# Patient Record
Sex: FEMALE | Race: WHITE | NOT HISPANIC OR LATINO | Employment: UNEMPLOYED | URBAN - NONMETROPOLITAN AREA
[De-identification: names, ages, dates, MRNs, and addresses within clinical notes are randomized per-mention and may not be internally consistent; named-entity substitution may affect disease eponyms.]

---

## 2022-06-22 ENCOUNTER — OFFICE VISIT (OUTPATIENT)
Dept: SURGERY | Facility: CLINIC | Age: 32
End: 2022-06-22

## 2022-06-22 VITALS
WEIGHT: 293 LBS | HEART RATE: 85 BPM | BODY MASS INDEX: 47.09 KG/M2 | HEIGHT: 66 IN | DIASTOLIC BLOOD PRESSURE: 76 MMHG | SYSTOLIC BLOOD PRESSURE: 118 MMHG | TEMPERATURE: 97.6 F

## 2022-06-22 DIAGNOSIS — R20.2 PARESTHESIA OF SKIN: Primary | ICD-10-CM

## 2022-06-22 PROBLEM — M54.9 BACK PAIN: Status: ACTIVE | Noted: 2022-06-22

## 2022-06-22 PROCEDURE — 11104 PUNCH BX SKIN SINGLE LESION: CPT | Performed by: NURSE PRACTITIONER

## 2022-06-22 PROCEDURE — 99203 OFFICE O/P NEW LOW 30 MIN: CPT | Performed by: NURSE PRACTITIONER

## 2022-06-22 PROCEDURE — 11105 PUNCH BX SKIN EA SEP/ADDL: CPT | Performed by: NURSE PRACTITIONER

## 2022-06-22 RX ORDER — SERTRALINE HYDROCHLORIDE 25 MG/1
TABLET, FILM COATED ORAL DAILY
COMMUNITY
Start: 2022-06-16

## 2022-06-22 RX ORDER — NORELGESTROMIN AND ETHINLY ESTRADIOL 150; 35 UG/D; UG/D
PATCH TRANSDERMAL
COMMUNITY
Start: 2022-06-21 | End: 2022-09-01

## 2022-06-22 RX ORDER — NAPROXEN 500 MG/1
500 TABLET ORAL 2 TIMES DAILY
COMMUNITY
Start: 2022-06-05 | End: 2022-09-01

## 2022-06-22 RX ORDER — CHOLECALCIFEROL (VITAMIN D3) 1250 MCG
50000 CAPSULE ORAL WEEKLY
COMMUNITY
Start: 2022-05-27

## 2022-06-22 RX ORDER — DULOXETIN HYDROCHLORIDE 30 MG/1
CAPSULE, DELAYED RELEASE ORAL 2 TIMES DAILY
COMMUNITY
Start: 2022-06-17

## 2022-06-22 RX ORDER — ACETAMINOPHEN AND CODEINE PHOSPHATE 300; 30 MG/1; MG/1
TABLET ORAL AS NEEDED
COMMUNITY
Start: 2022-05-12 | End: 2022-09-01

## 2022-06-22 RX ORDER — ETONOGESTREL 68 MG/1
IMPLANT SUBCUTANEOUS
COMMUNITY
Start: 2022-03-29 | End: 2022-09-01

## 2022-06-22 RX ORDER — TIZANIDINE 4 MG/1
TABLET ORAL 2 TIMES DAILY
COMMUNITY
Start: 2022-06-21 | End: 2022-09-01

## 2022-06-22 NOTE — PROGRESS NOTES
Renee Mosqueda is a 31 y.o. female     Chief Complaint: ***    History of Present Illness     Review of Systems   Constitutional: Negative.    HENT: Negative.    Eyes: Negative.    Respiratory: Negative.    Cardiovascular: Positive for leg swelling.   Gastrointestinal: Positive for constipation.        Hemorrhoids   Endocrine: Positive for heat intolerance.        Thirst   Genitourinary: Negative.         Urinary Incontinence   Musculoskeletal: Positive for back pain.        Leg Pain, Muscle Weakness, Arthritis   Skin: Negative.    Allergic/Immunologic: Negative.    Neurological: Positive for numbness.   Hematological: Negative.    Psychiatric/Behavioral: Negative.      Past Medical History:   Diagnosis Date   • Anxiety    • Back pain    • Seasonal allergies      Past Surgical History:   Procedure Laterality Date   • KNEE SURGERY Right     With meniscal repair     Family History   Problem Relation Age of Onset   • Hypertension Father    • Diabetes Maternal Aunt    • Diabetes Maternal Uncle    • Cancer Maternal Grandmother    • Cancer Paternal Grandfather      Social History     Socioeconomic History   • Marital status:    Tobacco Use   • Smoking status: Former Smoker     Quit date: 2019     Years since quitting: 3.4   • Smokeless tobacco: Never Used   • Tobacco comment: Electronuc Cigarette   Substance and Sexual Activity   • Alcohol use: Never     Allergies   Allergen Reactions   • Bactrim [Sulfamethoxazole-Trimethoprim] Anaphylaxis and Rash   • Citalopram Palpitations     Seratonin Syndrome   • Isopropyl Alcohol Rash   • Latex Rash     Vitals:    06/22/22 0836   BP: 118/76   Pulse: 85   Temp: 97.6 °F (36.4 °C)       Home Medications:  Prior to Admission medications    Medication Sig Start Date End Date Taking? Authorizing Provider   acetaminophen-codeine (TYLENOL #3) 300-30 MG per tablet As Needed. 5/12/22  Yes Provider, MD Aurelia   Cholecalciferol (Vitamin D3) 1.25 MG (81102 UT) capsule  Take 50,000 Units by mouth 1 (One) Time Per Week. 5/27/22  Yes ProviderAurelia MD   DULoxetine (CYMBALTA) 30 MG capsule 2 (Two) Times a Day. 6/17/22  Yes ProviderAurelia MD   naproxen (NAPROSYN) 500 MG tablet Take 500 mg by mouth 2 (Two) Times a Day. as directed 6/5/22  Yes Aurelia Couch MD   Nexplanon 68 MG implant subdermal implant  3/29/22  Yes Provider, MD Aurelia   sertraline (ZOLOFT) 25 MG tablet Daily. 6/16/22  Yes Provider, MD Aurelia   tiZANidine (ZANAFLEX) 4 MG tablet 2 (Two) Times a Day. 6/21/22  Yes ProviderAurelia MD Zafemy 150-35 MCG/24HR  6/21/22  Yes Provider, MD Aurelia       Objective   Physical Exam    Assessment & Plan       There were no encounter diagnoses.                         This document has been electronically signed by Gris Jacques MA on June 22, 2022 09:00 CDT

## 2022-06-22 NOTE — PROGRESS NOTES
"Chief Complaint  Advice Only (Consult for biopsy bilateral arm and legs)    Subjective        Sindy Mosqueda presents to [facility] GENERAL SURGERY  History of Present Illness  Ms. Sindy Mosqueda is a 31-year-old patient.  She was referred at request of Dr. Vallejo ([facility]) for punch biopsies of bilateral feet and legs to rule out small fiber neuropathy.  She states approximately 10 years she began having \"pins and needles\" sensation of feet and lower legs as well as some numbness as well which has caused her to have some difficulty with ambulating.  She denies any trauma to the areas.  She states she was recently started on Cymbalta and claims that it is helping some.  It is noted that she has had extensive work-up including blood work-up for peripheral neuropathy and EMG study with normal findings.       Objective   Vital Signs:  /76   Pulse 85   Temp 97.6 °F (36.4 °C) (Temporal)   Ht 167.6 cm (66\")   Wt 135 kg (297 lb 9.6 oz)   BMI 48.03 kg/m²   Estimated body mass index is 48.03 kg/m² as calculated from the following:    Height as of this encounter: 167.6 cm (66\").    Weight as of this encounter: 135 kg (297 lb 9.6 oz).    Class 3 Severe Obesity (BMI >=40). Obesity-related health conditions include the following: none. Obesity is unchanged. BMI is is above average; BMI management plan is completed. Information added to AVS.        Physical Exam  Vitals reviewed.   Constitutional:       General: She is not in acute distress.     Appearance: Normal appearance. She is obese. She is not ill-appearing, toxic-appearing or diaphoretic.   Cardiovascular:      Rate and Rhythm: Normal rate.   Pulmonary:      Effort: Pulmonary effort is normal. No respiratory distress.   Skin:         Neurological:      General: No focal deficit present.      Mental Status: She is alert and oriented to person, place, and time.   Psychiatric:         Mood and Affect: Mood normal.         " Behavior: Behavior normal.         Thought Content: Thought content normal.         Judgment: Judgment normal.        Consent obtained. After prepping the skin with Betadine and locally anesthetizing the areas with 2 cc of 2% xylocaine with epinephrine, a punch biopsy performed of each of the 4 areas indicated above. Hemostasis is accomplished with gelfoam and pressure. Tolerated well. Bandage applied to each area and specimens taken to pathology to be sent to [facility] for evaluation.            Result Review :                 Class 3 Severe Obesity (BMI >=40). Obesity-related health conditions include the following: none. Obesity is unchanged. BMI is is above average; BMI management plan is completed. We discussed portion control and increasing exercise.      Assessment and Plan   Diagnoses and all orders for this visit:    1. Paresthesia of skin (Primary)  -     Tissue Pathology Exam; Future  -     Tissue Pathology Exam           I spent 35 minutes caring for Sindy on this date of service. This time includes time spent by me in the following activities:preparing for the visit, obtaining and/or reviewing a separately obtained history, performing a medically appropriate examination and/or evaluation , documenting information in the medical record, care coordination and punch biopsies   Follow Up {Instructions Charge Capture  Follow-up Communications :23}  Return if symptoms worsen or fail to improve.  Patient may shower over biopsy sites in 24 hours cover with dry bandage as needed.  Results will be forwarded to Dr. Vallejo for review and further treatment.  Patient to follow-up on as needed basis. She is aware of signs/symptoms that would require further medical attention.       Patient was given instructions and counseling regarding her condition or for health maintenance advice. Please see specific information pulled into the AVS if appropriate.                 This document has been electronically signed  by Vanesa Morrison APRN on June 22, 2022 14:11 CDT

## 2022-06-29 ENCOUNTER — PATIENT ROUNDING (BHMG ONLY) (OUTPATIENT)
Dept: SURGERY | Facility: CLINIC | Age: 32
End: 2022-06-29

## 2022-06-29 NOTE — PROGRESS NOTES
"June 29, 2022    Hello, may I speak with Sindy Mosqueda? This is Sindy    My name is Gris Jacques    I am a Medical Assistant with [facility] GENERAL SURGERY    Before we get started may I verify your date of birth? 1990 Date Of Birth Verified.    I am calling to officially welcome you to our practice and ask about your recent visit. Is this a good time to talk? Yes.    Tell me about your visit with us. What things went well? \"Everything went well. Everyone was kind and welcoming.\"    We're always looking for ways to make our patients' experiences even better. Do you have recommendations on ways we may improve?  \"No, I think you all did a fantastic job.\"    Overall were you satisfied with your first visit to our practice? Yes.       I appreciate you taking the time to speak with me today. Is there anything else I can do for you? No.      Thank you, and have a great day.    Patient instructed to call the office with any questions or concerns.  "

## 2022-07-08 LAB
LAB AP CASE REPORT: NORMAL
PATH REPORT.FINAL DX SPEC: NORMAL

## 2022-09-01 ENCOUNTER — OFFICE VISIT (OUTPATIENT)
Dept: CARDIOLOGY | Facility: CLINIC | Age: 32
End: 2022-09-01

## 2022-09-01 VITALS
WEIGHT: 293 LBS | HEIGHT: 66 IN | DIASTOLIC BLOOD PRESSURE: 82 MMHG | BODY MASS INDEX: 47.09 KG/M2 | SYSTOLIC BLOOD PRESSURE: 140 MMHG | OXYGEN SATURATION: 98 % | TEMPERATURE: 97.1 F | HEART RATE: 100 BPM

## 2022-09-01 DIAGNOSIS — I10 ESSENTIAL HYPERTENSION: ICD-10-CM

## 2022-09-01 DIAGNOSIS — R00.2 PALPITATIONS: Primary | ICD-10-CM

## 2022-09-01 DIAGNOSIS — R06.09 DYSPNEA ON EXERTION: ICD-10-CM

## 2022-09-01 DIAGNOSIS — E66.01 CLASS 3 SEVERE OBESITY DUE TO EXCESS CALORIES WITH SERIOUS COMORBIDITY AND BODY MASS INDEX (BMI) OF 45.0 TO 49.9 IN ADULT: ICD-10-CM

## 2022-09-01 PROBLEM — E66.2 HYPOVENTILATION ASSOCIATED WITH OBESITY: Status: RESOLVED | Noted: 2022-09-01 | Resolved: 2022-09-01

## 2022-09-01 PROBLEM — E66.2 HYPOVENTILATION ASSOCIATED WITH OBESITY: Status: ACTIVE | Noted: 2022-09-01

## 2022-09-01 LAB
QT INTERVAL: 320 MS
QTC INTERVAL: 412 MS

## 2022-09-01 PROCEDURE — 93000 ELECTROCARDIOGRAM COMPLETE: CPT | Performed by: INTERNAL MEDICINE

## 2022-09-01 PROCEDURE — 99204 OFFICE O/P NEW MOD 45 MIN: CPT | Performed by: INTERNAL MEDICINE

## 2022-09-01 RX ORDER — LEFLUNOMIDE 20 MG/1
TABLET ORAL
COMMUNITY
Start: 2022-08-30

## 2022-09-01 RX ORDER — LISINOPRIL 10 MG/1
TABLET ORAL
COMMUNITY
Start: 2022-08-19

## 2022-09-01 RX ORDER — FOLIC ACID 1 MG/1
1000 TABLET ORAL DAILY
COMMUNITY
Start: 2022-08-01 | End: 2023-02-09

## 2022-09-01 NOTE — PROGRESS NOTES
Sindy Mosqueda  31 y.o. female    09/01/2022  1. Palpitations    2. Dyspnea on exertion    3. Essential hypertension    4. Class 3 severe obesity due to excess calories with serious comorbidity and body mass index (BMI) of 45.0 to 49.9 in adult (HCC)        History of Present Illness  Sindy Mosqueda is a 31-year-old female who was referred by primary care physician for evaluation of palpitation.  Apparently the symptoms have been going on for about 5 years and they have been intermittent.  They occur at different times and has occurred both at rest and with activity.  Heart rate is frequently above 100 bpm.  She has not been seen by cardiology in the past.  She has no previous documented coronary artery disease, valvular heart disease or congenital heart disease. No history of heart murmurs in the past.  She has history of hypertension but no diabetes mellitus, thyroid problems.  She has a history of anxiety/depression, opioid dependence in the past.  She has had history of pins-and-needles in the legs and has had evaluation in June 2022 for this.    She denies any chest pain but does have NYHA class II dyspnea on exertion.  No PND or orthopnea is reported.  She does have osteoarthritis involving both her knees with difficulty in ambulation.  Her BMI is 47.7.  She does not drink excessive amounts of caffeine or tea and not on any over-the-counter medications.    EKG showed sinus rhythm with heart rate of 100 bpm.  No ST-T wave changes suggestive of ischemia.  No evidence of preexcitation.    SUBJECTIVE    Allergies   Allergen Reactions   • Bactrim [Sulfamethoxazole-Trimethoprim] Anaphylaxis and Rash   • Citalopram Palpitations     Seratonin Syndrome   • Isopropyl Alcohol Rash   • Latex Rash         Past Medical History:   Diagnosis Date   • Anxiety    • Back pain    • Seasonal allergies          Past Surgical History:   Procedure Laterality Date   • KNEE SURGERY Right     With meniscal repair         Family  "History   Problem Relation Age of Onset   • Hypertension Father    • Diabetes Maternal Aunt    • Diabetes Maternal Uncle    • Cancer Maternal Grandmother    • Cancer Paternal Grandfather          Social History     Socioeconomic History   • Marital status:    Tobacco Use   • Smoking status: Former Smoker     Quit date: 2019     Years since quitting: 3.6   • Smokeless tobacco: Never Used   • Tobacco comment: Electronuc Cigarette   Substance and Sexual Activity   • Alcohol use: Never         Current Outpatient Medications   Medication Sig Dispense Refill   • Cholecalciferol (Vitamin D3) 1.25 MG (05421 UT) capsule Take 50,000 Units by mouth 1 (One) Time Per Week.     • DULoxetine (CYMBALTA) 30 MG capsule 2 (Two) Times a Day.     • folic acid (FOLVITE) 1 MG tablet Take 1,000 mcg by mouth Daily.     • leflunomide (ARAVA) 20 MG tablet      • lisinopril (PRINIVIL,ZESTRIL) 10 MG tablet      • sertraline (ZOLOFT) 25 MG tablet Daily.       No current facility-administered medications for this visit.         OBJECTIVE    /82 (BP Location: Left arm, Patient Position: Sitting, Cuff Size: Adult)   Pulse 100   Temp 97.1 °F (36.2 °C)   Ht 167.6 cm (66\")   Wt 134 kg (295 lb 9.6 oz)   SpO2 98%   BMI 47.71 kg/m²         Review of Systems     Constitutional:  Denies recent weight loss, weight gain, fever or chills     HENT:  Denies any hearing loss, epistaxis, hoarseness, or difficulty speaking.     Eyes: Wears eyeglasses or contact lenses     Respiratory:  Dyspnea with exertion, no cough, wheezing, or hemoptysis.     Cardiovascular: See HPI    Gastrointestinal:  Denies change in bowel habits, dyspepsia, ulcer disease, hematochezia, or melena.     Endocrine: Negative for cold intolerance, heat intolerance, polydipsia, polyphagia and polyuria.     Genitourinary: Negative.      Musculoskeletal: DJD.  Osteoarthritis both knees    Skin:  Denies any change in hair or nails, rashes, or skin lesions. "     Allergic/Immunologic: Negative.  Negative for environmental allergies, food allergies and/or immunocompromised state.     Neurological:  Denies any history of recurrent headaches, strokes, TIA, or seizure disorder.  Tingling and numbness both feet.    Hematological: Denies any food allergies, seasonal allergies, bleeding disorders, or lymphadenopathy.     Psychiatric/Behavioral: Denies any history of depression, substance abuse, or change in cognitive function.         Physical Exam     Constitutional: Cooperative, alert and oriented, in no acute distress.  Obese with a BMI of 47.7    HENT:   Head: Normocephalic, normal hair patterns, no masses or tenderness.  Ears, Nose, and Throat: No gross abnormalities. No pallor or cyanosis. Dentition good.   Eyes: EOMS intact, PERRL, conjunctivae and lids unremarkable. Fundoscopic exam and visual fields not performed.   Neck: No palpable masses or adenopathy, no thyromegaly, no JVD, carotid pulses are full and equal bilaterally and without bruit.     Cardiovascular: Regular rhythm, S1 and S2 normal, no S3 or S4.  No murmurs, gallops, or rubs detected.  Tachycardia    Pulmonary/Chest: Chest: normal symmetry, normal respiratory excursion, no intercostal retraction, no use of accessory muscles.     Pulmonary: Normal breath sounds. No rales or rhonchi.    Abdominal: Abdomen soft, bowel sounds normoactive, no masses, no hepatosplenomegaly, nontender, no bruit.     Musculoskeletal: No deformities, clubbing, cyanosis, erythema, or edema observed.     Neurological: No gross motor or sensory deficits noted, affect appropriate, oriented to time, person, place.     Skin: Warm and dry to the touch, no apparent skin lesion or mass noted.     Psychiatric: She has a normal mood and affect. Her behavior is normal. Judgment and thought content normal.         Procedures      No results found for: WBC, HGB, HCT, MCV, PLT  No results found for: GLUCOSE, BUN, CREATININE, EGFRIFNONA,  EGFRIFAFRI, BCR, POTASSIUM, CO2, CALCIUM, PROTENTOTREF, ALBUMIN, LABIL2, BILIRUBIN, AST, ALT  No results found for: CHOL  No results found for: TRIG  No results found for: HDL  No components found for: LDLCALC  No results found for: LDL  No results found for: HDLLDLRATIO  No components found for: CHOLHDL  No results found for: HGBA1C  No results found for: TSH, K2TMUZV, Q1WMCQN, THYROIDAB        ASSESSMENT AND PLAN  Sindy Mosqueda is a 31-year-old female who is presented with longstanding history of palpitation in the background of obesity, hypertension, anxiety/depression, DJD.  She does have sinus tachycardia on baseline EKG.  One of the previous EKG did raise a question of incomplete RBBB.  To make sure that there are no sustained cardiac arrhythmias the plan will be to proceed with a Holter monitor for 72 hours.  An echocardiogram to assess left ventricular and valvular function has been arranged.  I suspect that anxiety is a significant contributing factor and the plan will be to consider beta-blockers after reviewing the results of the above test.  Suspicion for ongoing ischemia is low.  Her thyroid function studies have been normal in the past and CBC, CMP were within normal limits when checked in August 2022.  Further recommendations will follow.  Thank you for asked me to see this patient.    Diagnoses and all orders for this visit:    1. Palpitations (Primary)  -     ECG 12 Lead    2. Dyspnea on exertion    3. Essential hypertension    4. Class 3 severe obesity due to excess calories with serious comorbidity and body mass index (BMI) of 45.0 to 49.9 in adult (HCC)        Class 3 Severe Obesity (BMI >=40). Obesity-related health conditions include the following: hypertension and osteoarthritis. Obesity is unchanged. BMI is is above average; BMI management plan is completed. We discussed portion control and increasing exercise.      Sindy Mosqueda  reports that she quit smoking about 3 years ago. She has  never used smokeless tobacco..             Wili Hernandez MD  9/1/2022  09:25 CDT

## 2022-09-02 ENCOUNTER — PATIENT ROUNDING (BHMG ONLY) (OUTPATIENT)
Dept: CARDIOLOGY | Facility: CLINIC | Age: 32
End: 2022-09-02

## 2022-09-02 NOTE — PROGRESS NOTES
September 2, 2022    Hello, may I speak with Sindy Mosqueda?    My name is MARCOS Boggs      I am  with [facility] CARDIOLOGY [facility] CARDIOLOGY  [facility] [address] [place] [address] [phone].    Before we get started may I verify your date of birth? 1990    I am calling to officially welcome you to our practice and ask about your recent visit. Is this a good time to talk? yes    Tell me about your visit with us. What things went well?  The doctor and his nurse was very nice.        We're always looking for ways to make our patients' experiences even better. Do you have recommendations on ways we may improve?  no    Overall were you satisfied with your first visit to our practice? yes       I appreciate you taking the time to speak with me today. Is there anything else I can do for you? no      Thank you, and have a great day.

## 2022-09-08 ENCOUNTER — TELEPHONE (OUTPATIENT)
Dept: CARDIOLOGY | Facility: CLINIC | Age: 32
End: 2022-09-08

## 2022-09-08 RX ORDER — METOPROLOL SUCCINATE 25 MG/1
25 TABLET, EXTENDED RELEASE ORAL NIGHTLY
Qty: 90 TABLET | Refills: 3 | Status: SHIPPED | OUTPATIENT
Start: 2022-09-08 | End: 2022-10-04

## 2022-09-08 NOTE — TELEPHONE ENCOUNTER
Called pt no answer left vm to call the office medication was sent to her pharmacy----- Message from Wili Hernandez MD sent at 9/8/2022  8:37 AM CDT -----  Holter monitor did show sinus tachycardia.  No significant cardiac arrhythmias noted.  We can start her on metoprolol succinate 25 mg daily at bedtime to see if her palpitations and increased heart rate improves.  ----- Message -----  From: Wili Hernandez MD  Sent: 9/7/2022   9:13 AM CDT  To: Wili Hernandez MD

## 2022-10-04 RX ORDER — ATENOLOL 25 MG/1
25 TABLET ORAL DAILY
Qty: 90 TABLET | Refills: 2 | Status: SHIPPED | OUTPATIENT
Start: 2022-10-04

## 2023-02-09 ENCOUNTER — OFFICE VISIT (OUTPATIENT)
Dept: CARDIOLOGY | Facility: CLINIC | Age: 33
End: 2023-02-09
Payer: COMMERCIAL

## 2023-02-09 VITALS
WEIGHT: 293 LBS | DIASTOLIC BLOOD PRESSURE: 78 MMHG | BODY MASS INDEX: 47.09 KG/M2 | HEART RATE: 88 BPM | HEIGHT: 66 IN | TEMPERATURE: 97.1 F | OXYGEN SATURATION: 97 % | SYSTOLIC BLOOD PRESSURE: 136 MMHG

## 2023-02-09 DIAGNOSIS — E66.01 CLASS 3 SEVERE OBESITY DUE TO EXCESS CALORIES WITH SERIOUS COMORBIDITY AND BODY MASS INDEX (BMI) OF 45.0 TO 49.9 IN ADULT: ICD-10-CM

## 2023-02-09 DIAGNOSIS — R00.2 PALPITATIONS: ICD-10-CM

## 2023-02-09 DIAGNOSIS — I10 ESSENTIAL HYPERTENSION: Primary | ICD-10-CM

## 2023-02-09 PROCEDURE — 99214 OFFICE O/P EST MOD 30 MIN: CPT | Performed by: INTERNAL MEDICINE

## 2023-02-09 RX ORDER — ADALIMUMAB 40MG/0.4ML
KIT SUBCUTANEOUS
COMMUNITY
Start: 2023-01-12

## 2023-02-09 RX ORDER — PERPHENAZINE 16 MG
TABLET ORAL EVERY 8 HOURS SCHEDULED
COMMUNITY

## 2023-02-09 NOTE — PROGRESS NOTES
Sindy Mosqueda  32 y.o. female    1. Essential hypertension    2. Palpitations    3. Class 3 severe obesity due to excess calories with serious comorbidity and body mass index (BMI) of 45.0 to 49.9 in adult (HCC)        History of Present Illness  Sindy Mosqueda is a 32-year-old female who was referred by primary care physician for evaluation of palpitation.  Apparently the symptoms have been going on for about 5 years and they have been intermittent.  They occur at different times and has occurred both at rest and with activity.  Heart rate is frequently above 100 bpm.  She has not been seen by cardiology in the past.  She has no previous documented coronary artery disease, valvular heart disease or congenital heart disease. No history of heart murmurs in the past.  She has history of hypertension but no diabetes mellitus, thyroid problems.  She has a history of anxiety/depression, opioid dependence in the past.  She has had history of pins-and-needles in the legs and has had evaluation in June 2022 for this.    She denies any chest pain but does have NYHA class II dyspnea on exertion.  No PND or orthopnea is reported.  She does have osteoarthritis involving both her knees with difficulty in ambulation.  Her BMI is 47.7.  She does not drink excessive amounts of caffeine or tea and not on any over-the-counter medications.    Further testing included a Holter monitor which showed the following findings in September 2022.  Result: Underlying rhythm was sinus with heart rate ranging from 87bpm to 165 bpm with an average heart rate of 131 bpm.  There were no PACs or PVCs noted.  No supraventricular or ventricular arrhythmias noted.  There was 1 patient triggered event which correlated with sinus tachycardia.  Impression: Holter monitoring showing essentially sinus rhythm with sinus tachycardia.  No supraventricular or ventricular arrhythmias noted.    Echocardiogram November 2022 showed:   •  Left ventricular  ejection fraction appears to be 61 - 65%.  •  Estimated right ventricular systolic pressure from tricuspid regurgitation is normal (<35 mmHg).  •  No significant valve abnormalities     She was started initially on metoprolol succinate but she was unable to tolerate this medicine and hence we changed it to atenolol 25 mg daily. She seems to have tolerated this quite well and the heart rate has improved.  She informs me that more than 80% of the time her heart rate is within normal limits.  Her palpitations are rare.    SUBJECTIVE    Allergies   Allergen Reactions   • Bactrim [Sulfamethoxazole-Trimethoprim] Anaphylaxis and Rash   • Nsaids Other (See Comments)   • Citalopram Palpitations     Seratonin Syndrome   • Isopropyl Alcohol Rash   • Latex Rash         Past Medical History:   Diagnosis Date   • Anxiety    • Back pain    • Seasonal allergies          Past Surgical History:   Procedure Laterality Date   • KNEE SURGERY Right     With meniscal repair         Family History   Problem Relation Age of Onset   • Hypertension Father    • Diabetes Maternal Aunt    • Diabetes Maternal Uncle    • Cancer Maternal Grandmother    • Cancer Paternal Grandfather          Social History     Socioeconomic History   • Marital status:    Tobacco Use   • Smoking status: Former     Types: Cigarettes     Quit date:      Years since quittin.1   • Smokeless tobacco: Never   • Tobacco comments:     Electronuc Cigarette   Substance and Sexual Activity   • Alcohol use: Never         Current Outpatient Medications   Medication Sig Dispense Refill   • Alpha-Lipoic Acid 600 MG capsule Every 8 (Eight) Hours.     • atenolol (TENORMIN) 25 MG tablet Take 1 tablet by mouth Daily. 90 tablet 2   • Cholecalciferol (Vitamin D3) 1.25 MG (54424 UT) capsule Take 50,000 Units by mouth 1 (One) Time Per Week.     • DULoxetine (CYMBALTA) 30 MG capsule 2 (Two) Times a Day.     • Humira Pen 40 MG/0.4ML Pen-injector Kit      • leflunomide (ARAVA)  "20 MG tablet      • lisinopril (PRINIVIL,ZESTRIL) 10 MG tablet      • sertraline (ZOLOFT) 25 MG tablet Daily.       No current facility-administered medications for this visit.         OBJECTIVE    /78 (BP Location: Left arm, Patient Position: Sitting, Cuff Size: Adult)   Pulse 88   Temp 97.1 °F (36.2 °C)   Ht 167.6 cm (66\")   Wt (!) 137 kg (303 lb)   SpO2 97%   BMI 48.91 kg/m²         Review of Systems: The following systems were reviewed and changes noted as indicated below    Constitutional:  Denies recent weight loss, weight gain, fever or chills     HENT:  Denies any hearing loss, epistaxis, hoarseness, or difficulty speaking.     Eyes: Wears eyeglasses or contact lenses     Respiratory:  Dyspnea with exertion, no cough, wheezing, or hemoptysis.     Cardiovascular: Palpitations better.    Gastrointestinal:  Denies change in bowel habits, dyspepsia, ulcer disease, hematochezia, or melena.     Endocrine: Negative for cold intolerance, heat intolerance, polydipsia, polyphagia and polyuria.     Genitourinary: Negative.      Musculoskeletal: DJD.  Osteoarthritis both knees    Skin:  Denies any change in hair or nails, rashes, or skin lesions.     Allergic/Immunologic: Negative.  Negative for environmental allergies, food allergies and/or immunocompromised state.     Neurological:  Denies any history of recurrent headaches, strokes, TIA, or seizure disorder.  Tingling and numbness both feet.    Hematological: Denies any food allergies, seasonal allergies, bleeding disorders, or lymphadenopathy.     Psychiatric/Behavioral: Denies any history of depression, substance abuse, or change in cognitive function.       Physical Exam: The following systems were reviewed and changes noted as indicated below    Constitutional: Cooperative, alert and oriented, in no acute distress.  Obese with a BMI of 48.9  HENT:   Head: Normocephalic, normal hair patterns, no masses or tenderness.  Ears, Nose, and Throat: No gross " abnormalities. No pallor or cyanosis. Dentition good.   Eyes: EOMS intact, PERRL, conjunctivae and lids unremarkable. Fundoscopic exam and visual fields not performed.   Neck: No palpable masses or adenopathy, no thyromegaly, no JVD, carotid pulses are full and equal bilaterally and without bruit.     Cardiovascular: Regular rhythm, S1 and S2 normal, no S3 or S4.  No murmurs, gallops, or rubs detected.  Tachycardia    Pulmonary/Chest: Chest: normal symmetry, normal respiratory excursion, no intercostal retraction, no use of accessory muscles.     Pulmonary: Normal breath sounds. No rales or rhonchi.    Abdominal: Abdomen soft, bowel sounds normoactive, no masses, no hepatosplenomegaly, nontender, no bruit.     Musculoskeletal: No deformities, clubbing, cyanosis, erythema, or edema observed.     Neurological: No gross motor or sensory deficits noted, affect appropriate, oriented to time, person, place.     Skin: Warm and dry to the touch, no apparent skin lesion or mass noted.     Psychiatric: She has a normal mood and affect. Her behavior is normal. Judgment and thought content normal.         Procedures      No results found for: WBC, HGB, HCT, MCV, PLT  No results found for: GLUCOSE, BUN, CREATININE, EGFRIFNONA, EGFRIFAFRI, BCR, POTASSIUM, CO2, CALCIUM, PROTENTOTREF, ALBUMIN, LABIL2, BILIRUBIN, AST, ALT  No results found for: CHOL  No results found for: TRIG  No results found for: HDL  No components found for: LDLCALC  No results found for: LDL  No results found for: HDLLDLRATIO  No components found for: CHOLHDL  No results found for: HGBA1C  No results found for: TSH, B4WTGLD, S6VTWHQ, THYROIDAB        ASSESSMENT AND PLAN  Sindy Mosqueda is a 32-year-old female who presented with longstanding history of palpitation in the background of obesity, hypertension, anxiety/depression, DJD.  She does have sinus tachycardia on baseline EKG.  One of the previous EKG did raise a question of incomplete RBBB.  She was noted  to have sinus tachycardia on Holter monitor.  No supraventricular or ventricular arrhythmias noted.  Echocardiogram showed normal LV systolic function with no significant valve abnormalities.    Her heart rate and blood pressure were in the acceptable normal range today.  After reviewing her medicines of continued antihypertensive therapy with lisinopril and atenolol 25 mg daily has been continued.  No further cardiac work-up indicated at this time.    Diagnoses and all orders for this visit:    1. Essential hypertension (Primary)    2. Palpitations    3. Class 3 severe obesity due to excess calories with serious comorbidity and body mass index (BMI) of 45.0 to 49.9 in adult (HCC)        Class 3 Severe Obesity (BMI >=40). Obesity-related health conditions include the following: hypertension and osteoarthritis. Obesity is unchanged. BMI is is above average; BMI management plan is completed. We discussed portion control and increasing exercise.      Sindy Mosqueda  reports that she quit smoking about 4 years ago. Her smoking use included cigarettes. She has never used smokeless tobacco..             Wili Hernandez MD  2/9/2023  09:50 CST

## 2023-08-10 ENCOUNTER — OFFICE VISIT (OUTPATIENT)
Dept: CARDIOLOGY | Facility: CLINIC | Age: 33
End: 2023-08-10
Payer: COMMERCIAL

## 2023-08-10 VITALS
DIASTOLIC BLOOD PRESSURE: 78 MMHG | HEART RATE: 77 BPM | TEMPERATURE: 97.1 F | WEIGHT: 293 LBS | OXYGEN SATURATION: 97 % | BODY MASS INDEX: 47.09 KG/M2 | SYSTOLIC BLOOD PRESSURE: 136 MMHG | HEIGHT: 66 IN

## 2023-08-10 DIAGNOSIS — R00.2 PALPITATIONS: Primary | ICD-10-CM

## 2023-08-10 DIAGNOSIS — I10 ESSENTIAL HYPERTENSION: ICD-10-CM

## 2023-08-10 DIAGNOSIS — R06.09 DYSPNEA ON EXERTION: ICD-10-CM

## 2023-08-10 DIAGNOSIS — E66.01 CLASS 3 SEVERE OBESITY DUE TO EXCESS CALORIES WITH SERIOUS COMORBIDITY AND BODY MASS INDEX (BMI) OF 50.0 TO 59.9 IN ADULT: ICD-10-CM

## 2023-08-10 PROCEDURE — 1160F RVW MEDS BY RX/DR IN RCRD: CPT | Performed by: INTERNAL MEDICINE

## 2023-08-10 PROCEDURE — 1159F MED LIST DOCD IN RCRD: CPT | Performed by: INTERNAL MEDICINE

## 2023-08-10 PROCEDURE — 3075F SYST BP GE 130 - 139MM HG: CPT | Performed by: INTERNAL MEDICINE

## 2023-08-10 PROCEDURE — 99214 OFFICE O/P EST MOD 30 MIN: CPT | Performed by: INTERNAL MEDICINE

## 2023-08-10 PROCEDURE — 3078F DIAST BP <80 MM HG: CPT | Performed by: INTERNAL MEDICINE

## 2023-08-10 RX ORDER — ESOMEPRAZOLE MAGNESIUM 40 MG/1
1 CAPSULE, DELAYED RELEASE ORAL DAILY
COMMUNITY
Start: 2023-07-23

## 2023-08-10 RX ORDER — PROPRANOLOL HYDROCHLORIDE 40 MG/1
TABLET ORAL
COMMUNITY
Start: 2023-08-08

## 2023-08-10 RX ORDER — LEVOCETIRIZINE DIHYDROCHLORIDE 5 MG/1
1 TABLET, FILM COATED ORAL DAILY
COMMUNITY
Start: 2023-07-07

## 2023-08-10 RX ORDER — ATORVASTATIN CALCIUM 20 MG/1
1 TABLET, FILM COATED ORAL DAILY
COMMUNITY
Start: 2023-07-16

## 2023-08-10 RX ORDER — CYCLOBENZAPRINE HCL 10 MG
TABLET ORAL
COMMUNITY
Start: 2023-08-08

## 2023-08-10 RX ORDER — ALBUTEROL SULFATE 90 UG/1
AEROSOL, METERED RESPIRATORY (INHALATION)
COMMUNITY
Start: 2023-04-14

## 2023-08-10 RX ORDER — REPOSITORY CORTICOTROPIN 80 [USP'U]/ML
INJECTION INTRAMUSCULAR; SUBCUTANEOUS
COMMUNITY
Start: 2023-07-29

## 2023-08-10 RX ORDER — PREDNISONE 10 MG/1
TABLET ORAL
COMMUNITY
Start: 2023-08-04

## 2023-08-10 RX ORDER — TOCILIZUMAB 180 MG/ML
INJECTION, SOLUTION SUBCUTANEOUS
COMMUNITY
Start: 2023-08-08

## 2023-08-10 RX ORDER — PREGABALIN 25 MG/1
CAPSULE ORAL
COMMUNITY
Start: 2023-07-18

## 2023-08-10 NOTE — PROGRESS NOTES
Sindy Mosqueda  32 y.o. female    1. Palpitations    2. Class 3 severe obesity due to excess calories with serious comorbidity and body mass index (BMI) of 50.0 to 59.9 in adult    3. Essential hypertension    4. Dyspnea on exertion        History of Present Illness  Sindy Mosqueda is a 32-year-old female who was referred by primary care physician for evaluation of palpitation.  Apparently the symptoms have been going on for about 5 years and they have been intermittent.  They occur at different times and has occurred both at rest and with activity.  Heart rate is frequently above 100 bpm.  She has not been seen by cardiology in the past.  She has no previous documented coronary artery disease, valvular heart disease or congenital heart disease. No history of heart murmurs in the past.  She has history of hypertension but no diabetes mellitus, thyroid problems.  She has a history of anxiety/depression, opioid dependence in the past.  She has had history of pins-and-needles in the legs and has had evaluation in June 2022 for this.    She denies any chest pain but does have NYHA class II dyspnea on exertion.  No PND or orthopnea is reported.  She does have osteoarthritis involving both her knees with difficulty in ambulation.  She does not drink excessive amounts of caffeine or tea and not on any over-the-counter medications.    Further testing included a Holter monitor which showed the following findings in September 2022.  Result: Underlying rhythm was sinus with heart rate ranging from 87bpm to 165 bpm with an average heart rate of 131 bpm.  There were no PACs or PVCs noted.  No supraventricular or ventricular arrhythmias noted.  There was 1 patient triggered event which correlated with sinus tachycardia.  Impression: Holter monitoring showing essentially sinus rhythm with sinus tachycardia.  No supraventricular or ventricular arrhythmias noted.    Echocardiogram November 2022 showed:     Left ventricular  ejection fraction appears to be 61 - 65%.    Estimated right ventricular systolic pressure from tricuspid regurgitation is normal (<35 mmHg).    No significant valve abnormalities     EKG today showed sinus rhythm with heart rate of 77 bpm.  No ST-T wave changes suggestive of ischemia.  No arrhythmia noted.    I had started her on atenolol which she seems to be tolerating well and doing well with, but neurologist changed to propranolol for treating tremors.  She is currently on 40 mg daily and feels that this is not keeping her heart rate under control at times.  Her blood pressure has been in the normal range.  Her tremors are not well-controlled.  She is being followed by hematology for abnormalities in CBC.  I do not have her blood test results and she will arrange for us to get a copy of it.      SUBJECTIVE    Allergies   Allergen Reactions    Bactrim [Sulfamethoxazole-Trimethoprim] Anaphylaxis and Rash    Nsaids Other (See Comments)    Citalopram Palpitations     Seratonin Syndrome    Isopropyl Alcohol Rash    Latex Rash         Past Medical History:   Diagnosis Date    Anxiety     Back pain     Seasonal allergies          Past Surgical History:   Procedure Laterality Date    KNEE SURGERY Right     With meniscal repair         Family History   Problem Relation Age of Onset    Hypertension Father     Diabetes Maternal Aunt     Diabetes Maternal Uncle     Cancer Maternal Grandmother     Cancer Paternal Grandfather          Social History     Socioeconomic History    Marital status:    Tobacco Use    Smoking status: Former     Types: Cigarettes     Quit date: 2019     Years since quittin.6    Smokeless tobacco: Never    Tobacco comments:     Electronuc Cigarette   Substance and Sexual Activity    Alcohol use: Never         Current Outpatient Medications   Medication Sig Dispense Refill    Actemra ACTPen 162 MG/0.9ML solution auto-injector injection       Acthar 80 UNIT/ML injectable gel        "Alpha-Lipoic Acid 600 MG capsule Every 8 (Eight) Hours.      atorvastatin (LIPITOR) 20 MG tablet Take 1 tablet by mouth Daily.      Cholecalciferol (Vitamin D3) 1.25 MG (20628 UT) capsule Take 1 capsule by mouth 1 (One) Time Per Week.      cyclobenzaprine (FLEXERIL) 10 MG tablet       DULoxetine (CYMBALTA) 30 MG capsule 2 (Two) Times a Day.      esomeprazole (nexIUM) 40 MG capsule Take 1 capsule by mouth Daily.      Humira Pen 40 MG/0.4ML Pen-injector Kit       leflunomide (ARAVA) 20 MG tablet       levocetirizine (XYZAL) 5 MG tablet Take 1 tablet by mouth Daily.      lisinopril (PRINIVIL,ZESTRIL) 10 MG tablet       predniSONE (DELTASONE) 10 MG tablet       pregabalin (LYRICA) 25 MG capsule       propranolol (INDERAL) 40 MG tablet       sertraline (ZOLOFT) 25 MG tablet Daily.      Ventolin  (90 Base) MCG/ACT inhaler        No current facility-administered medications for this visit.         OBJECTIVE    /78 (BP Location: Left arm, Patient Position: Sitting, Cuff Size: Adult)   Pulse 77   Temp 97.1 øF (36.2 øC)   Ht 167.6 cm (66\")   Wt (!) 141 kg (310 lb 12.8 oz)   SpO2 97%   BMI 50.16 kg/mý         Review of Systems: The following systems were reviewed and changes noted as indicated below    Constitutional:  Denies recent weight loss, weight gain, fever or chills     HENT:  Denies any hearing loss, epistaxis, hoarseness, or difficulty speaking.     Eyes: Wears eyeglasses or contact lenses     Respiratory:  Dyspnea with exertion improved, no cough, wheezing, or hemoptysis.     Cardiovascular: See HPI    Gastrointestinal:  Denies change in bowel habits, dyspepsia, ulcer disease, hematochezia, or melena.     Endocrine: Negative for cold intolerance, heat intolerance, polydipsia, polyphagia and polyuria.     Genitourinary: Negative.      Musculoskeletal: DJD.  Osteoarthritis both knees    Skin:  Denies any change in hair or nails, rashes, or skin lesions.     Allergic/Immunologic: Negative.  Negative " for environmental allergies, food allergies and/or immunocompromised state.     Neurological:  Denies any history of recurrent headaches, strokes, TIA, or seizure disorder.  Tingling and numbness both feet.  Tremors    Hematological: Denies any food allergies, seasonal allergies, bleeding disorders, or lymphadenopathy.     Psychiatric/Behavioral: Denies any history of depression, substance abuse, or change in cognitive function.       Physical Exam: The following systems were reviewed and changes noted as indicated below    Constitutional: Cooperative, alert and oriented, in no acute distress.  Obese with a BMI of 50.2  HENT:   Head: Normocephalic, normal hair patterns, no masses or tenderness.  Ears, Nose, and Throat: No gross abnormalities. No pallor or cyanosis. Dentition good.   Eyes: EOMS intact, PERRL, conjunctivae and lids unremarkable. Fundoscopic exam and visual fields not performed.   Neck: No palpable masses or adenopathy, no thyromegaly, no JVD, carotid pulses are full and equal bilaterally and without bruit.     Cardiovascular: Regular rhythm, S1 and S2 normal, no S3 or S4.  No murmurs, gallops, or rubs detected.  Tachycardia    Pulmonary/Chest: Chest: normal symmetry, normal respiratory excursion, no intercostal retraction, no use of accessory muscles.     Pulmonary: Normal breath sounds. No rales or rhonchi.    Abdominal: Abdomen soft, bowel sounds normoactive, no masses, no hepatosplenomegaly, nontender, no bruit.     Musculoskeletal: No deformities, clubbing, cyanosis, erythema, or edema observed.     Neurological: No gross motor or sensory deficits noted, affect appropriate, oriented to time, person, place.     Skin: Warm and dry to the touch, no apparent skin lesion or mass noted.     Psychiatric: She has a normal mood and affect. Her behavior is normal. Judgment and thought content normal.         Procedures      No results found for: WBC, HGB, HCT, MCV, PLT  No results found for: GLUCOSE, BUN,  CREATININE, EGFRIFNONA, EGFRIFAFRI, BCR, POTASSIUM, CO2, CALCIUM, PROTENTOTREF, ALBUMIN, LABIL2, BILIRUBIN, AST, ALT  No results found for: CHOL  No results found for: TRIG  No results found for: HDL  No components found for: LDLCALC  No results found for: LDL  No results found for: HDLLDLRATIO  No components found for: CHOLHDL  No results found for: HGBA1C  No results found for: TSH, [id number], [id number], THYROIDAB        ASSESSMENT AND PLAN  Sindy Mosqueda is a 32-year-old female who presented with longstanding history of palpitation in the background of obesity, hypertension, anxiety/depression, DJD.    She was noted to have sinus tachycardia on Holter monitor.  No supraventricular or ventricular arrhythmias noted.  Echocardiogram showed normal LV systolic function with no significant valve abnormalities.    Her heart rate and blood pressure were in the acceptable normal range today.  After reviewing her medicines I have advised her to continue with propranolol 40 mg in a.m. and take an additional dose of 20 mg in p.m. if heart rate is not under control.  Lipid-lowering therapy with atorvastatin, antihypertensive therapy with lisinopril have been continued.  She has been advised to maintain a high fluid intake.  She will arrange for us to get a copy of her lab test results.    She will continue follow-up with her primary care physician and we will be glad to see her on a yearly basis.     Diagnoses and all orders for this visit:    1. Palpitations (Primary)  -     ECG 12 Lead    2. Class 3 severe obesity due to excess calories with serious comorbidity and body mass index (BMI) of 50.0 to 59.9 in adult    3. Essential hypertension    4. Dyspnea on exertion        Class 3 Severe Obesity (BMI >=40). Obesity-related health conditions include the following: hypertension and osteoarthritis. Obesity is unchanged. BMI is is above average; BMI management plan is completed. We discussed portion control and increasing  exercise.      Sindy Mosqueda  reports that she quit smoking about 4 years ago. Her smoking use included cigarettes. She has never used smokeless tobacco..             Wili Hernandez MD  8/10/2023  09:37 CDT